# Patient Record
Sex: FEMALE | Race: BLACK OR AFRICAN AMERICAN | Employment: FULL TIME | URBAN - METROPOLITAN AREA
[De-identification: names, ages, dates, MRNs, and addresses within clinical notes are randomized per-mention and may not be internally consistent; named-entity substitution may affect disease eponyms.]

---
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
## 2024-02-15 ENCOUNTER — TELEPHONE (OUTPATIENT)
Dept: OBGYN CLINIC | Age: 19
End: 2024-02-15

## 2024-02-15 RX ORDER — FLUCONAZOLE 150 MG/1
150 TABLET ORAL ONCE
Qty: 1 TABLET | Refills: 0 | Status: SHIPPED | OUTPATIENT
Start: 2024-02-15 | End: 2024-02-15

## 2024-02-15 RX ORDER — METRONIDAZOLE 500 MG/1
500 TABLET ORAL 2 TIMES DAILY
Qty: 14 TABLET | Refills: 0 | Status: SHIPPED | OUTPATIENT
Start: 2024-02-15 | End: 2024-02-22

## 2024-02-15 NOTE — TELEPHONE ENCOUNTER
GYN pt last seen in office for 12/1/23    Pt calling in stating she thinks she has bv and a yeast infection as she is having some white discharge and sometimes it can be clear but she wants to catch it before it gets worst. Asking for treatment to be sent to the pharmacy for her.     Meds pended

## 2024-08-09 ENCOUNTER — HOSPITAL ENCOUNTER (OUTPATIENT)
Age: 19
Setting detail: SPECIMEN
Discharge: HOME | End: 2024-08-09

## 2024-08-09 ENCOUNTER — OFFICE VISIT (OUTPATIENT)
Dept: OBGYN CLINIC | Age: 19
End: 2024-08-09

## 2024-08-09 VITALS
BODY MASS INDEX: 23.01 KG/M2 | SYSTOLIC BLOOD PRESSURE: 137 MMHG | HEIGHT: 67 IN | DIASTOLIC BLOOD PRESSURE: 76 MMHG | WEIGHT: 146.6 LBS

## 2024-08-09 DIAGNOSIS — Z11.3 ROUTINE SCREENING FOR STI (SEXUALLY TRANSMITTED INFECTION): ICD-10-CM

## 2024-08-09 DIAGNOSIS — Z11.3 ROUTINE SCREENING FOR STI (SEXUALLY TRANSMITTED INFECTION): Primary | ICD-10-CM

## 2024-08-09 DIAGNOSIS — N91.2 AMENORRHEA: ICD-10-CM

## 2024-08-09 LAB
C TRACH DNA SPEC QL PROBE+SIG AMP: NORMAL
CANDIDA SPECIES: NEGATIVE
CONTROL: PRESENT
GARDNERELLA VAGINALIS: POSITIVE
N GONORRHOEA DNA SPEC QL PROBE+SIG AMP: NORMAL
PREGNANCY TEST URINE, POC: NEGATIVE
SOURCE: ABNORMAL
SPECIMEN DESCRIPTION: NORMAL
TRICHOMONAS: NEGATIVE

## 2024-08-09 ASSESSMENT — ENCOUNTER SYMPTOMS
GASTROINTESTINAL NEGATIVE: 1
RESPIRATORY NEGATIVE: 1

## 2024-08-09 NOTE — PROGRESS NOTES
[facility]  [address]  [facility] [address]  Dept: [phone]  Dept Fax: [phone]         2024  Yelitza Flanagan       Chief Complaint  Chief Complaint   Patient presents with    Exposure to STD    .    Blood pressure 137/76, height 1.702 m (5' 7\"), weight 66.5 kg (146 lb 9.6 oz), not currently breastfeeding.    HPI:     Yelitza Flanagan  2005 is a 19 y.o.  here today for vaginal swabs/STI screening.  She is on Depo which she gets at the [facility] clinic inside her school.  She is current and her next injection is next week.  She is insisting on a pregnancy test.  Her last sexual activity was 2 weeks ago      OB History    Para Term  AB Living   0 0 0 0 0 0   SAB IAB Ectopic Molar Multiple Live Births   0 0 0 0 0 0       Past Medical History:   Diagnosis Date    Allergic rhinitis     has been on Claritin and Nasonex-had immunocap    Headache     referred to neurology 10/2/55-kauglbqsr-we imitrex and amitriptyline    Hives     Unknown Etiology    Streptococcal sore throat     1 since 3.14.12-no tonsils/no adenoids       Past Surgical History:   Procedure Laterality Date    ADENOIDECTOMY  2009    TONSILLECTOMY AND ADENOIDECTOMY  11       Family History   Problem Relation Age of Onset    High Blood Pressure Maternal Grandmother     Learning Disabilities Maternal Grandmother     Diabetes Maternal Grandfather     Heart Disease Maternal Grandfather         CAD    High Blood Pressure Maternal Grandfather     Learning Disabilities Maternal Grandfather     Mental Illness Maternal Grandfather         Bipolar disorder.    High Blood Pressure Mother     Miscarriages / Stillbirths Mother        Social History     Socioeconomic History    Marital status: Single     Spouse name: Not on file    Number of children: Not on file    Years of education: Not on file    Highest education level: Not

## 2024-12-26 ENCOUNTER — HOSPITAL ENCOUNTER (OUTPATIENT)
Age: 19
Setting detail: SPECIMEN
Discharge: HOME | End: 2024-12-26

## 2024-12-26 ENCOUNTER — OFFICE VISIT (OUTPATIENT)
Dept: OBGYN CLINIC | Age: 19
End: 2024-12-26

## 2024-12-26 VITALS
BODY MASS INDEX: 25.3 KG/M2 | DIASTOLIC BLOOD PRESSURE: 66 MMHG | SYSTOLIC BLOOD PRESSURE: 119 MMHG | WEIGHT: 161.2 LBS | HEIGHT: 67 IN

## 2024-12-26 DIAGNOSIS — N76.0 ACUTE VAGINITIS: ICD-10-CM

## 2024-12-26 DIAGNOSIS — Z11.3 ROUTINE SCREENING FOR STI (SEXUALLY TRANSMITTED INFECTION): ICD-10-CM

## 2024-12-26 DIAGNOSIS — Z01.419 ENCOUNTER FOR GYNECOLOGICAL EXAMINATION: Primary | ICD-10-CM

## 2024-12-26 LAB
CANDIDA SPECIES: NEGATIVE
GARDNERELLA VAGINALIS: POSITIVE
SOURCE: ABNORMAL
TRICHOMONAS: NEGATIVE

## 2024-12-26 ASSESSMENT — ENCOUNTER SYMPTOMS
COUGH: 0
RESPIRATORY NEGATIVE: 1
ALLERGIC/IMMUNOLOGIC NEGATIVE: 1
SHORTNESS OF BREATH: 0
ABDOMINAL DISTENTION: 0
GASTROINTESTINAL NEGATIVE: 1
BACK PAIN: 0

## 2024-12-26 NOTE — PROGRESS NOTES
Chaperone for Intimate Exam  Chaperone was offered as part of the rooming process. Patient declined and agrees to continue with exam without a chaperone.  Chaperone: NONE       
deferred due to age. Blind swab done  Musculoskeletal:         General: Normal range of motion.      Cervical back: Neck supple.   Skin:     General: Skin is warm and dry.   Neurological:      Mental Status: She is alert.   Psychiatric:         Mood and Affect: Mood normal.              ASSESSMENT/PLAN:    19 y.o.  Female; Annual   Diagnosis Orders   1. Encounter for gynecological examination        2. Routine screening for STI (sexually transmitted infection)  Vaginitis DNA Probe               1. Encounter for gynecological examination  2. Routine screening for STI (sexually transmitted infection)  -     Vaginitis DNA Probe; Future               - Pap not indicated due to age  - Birth control discussed- gets depo from clinic at school  - Smoking risk factors discussed  - Diet and exercise reviewed.   - Routine health maintenance per patient's PCP.    Return in about 2 months (around 2/26/2025) for repeat STI screening.        Electronically signed by MEHRDAD Gomez CNP on 12/26/24 at 7:54 AM EST

## 2024-12-27 RX ORDER — METRONIDAZOLE 500 MG/1
500 TABLET ORAL 2 TIMES DAILY
Qty: 14 TABLET | Refills: 0 | Status: SHIPPED | OUTPATIENT
Start: 2024-12-27 | End: 2025-01-03

## 2025-08-28 ENCOUNTER — HOSPITAL ENCOUNTER (OUTPATIENT)
Age: 20
Setting detail: SPECIMEN
Discharge: HOME | End: 2025-08-28

## 2025-08-28 DIAGNOSIS — N92.6 MISSED MENSES: Primary | ICD-10-CM

## 2025-08-28 DIAGNOSIS — N92.6 MISSED MENSES: ICD-10-CM

## 2025-08-28 LAB — B-HCG SERPL EIA 3RD IS-ACNC: <0.2 MIU/ML
